# Patient Record
Sex: MALE | Race: WHITE | NOT HISPANIC OR LATINO | Employment: FULL TIME | URBAN - METROPOLITAN AREA
[De-identification: names, ages, dates, MRNs, and addresses within clinical notes are randomized per-mention and may not be internally consistent; named-entity substitution may affect disease eponyms.]

---

## 2020-05-05 ENCOUNTER — APPOINTMENT (OUTPATIENT)
Dept: LAB | Facility: CLINIC | Age: 30
End: 2020-05-05
Payer: COMMERCIAL

## 2020-05-05 ENCOUNTER — RESULTS ONLY (OUTPATIENT)
Dept: LAB | Age: 30
End: 2020-05-05

## 2020-05-07 LAB
COVID-19 SPIKE RBD ABY TITER: NORMAL
COVID-19 SPIKE RBD ABY: NEGATIVE

## 2021-05-18 ENCOUNTER — TELEPHONE (OUTPATIENT)
Dept: FAMILY MEDICINE | Facility: CLINIC | Age: 31
End: 2021-05-18

## 2021-05-18 DIAGNOSIS — L03.039 CELLULITIS OF TOE, UNSPECIFIED LATERALITY: Primary | ICD-10-CM

## 2021-05-18 RX ORDER — CEPHALEXIN 500 MG/1
500 CAPSULE ORAL 3 TIMES DAILY
Qty: 30 CAPSULE | Refills: 0 | Status: SHIPPED | OUTPATIENT
Start: 2021-05-18 | End: 2021-05-28

## 2021-05-18 NOTE — TELEPHONE ENCOUNTER
Patient would like a prescription sent to the [facility].    [facility] [place]

## 2021-09-28 ENCOUNTER — TELEPHONE (OUTPATIENT)
Dept: FAMILY MEDICINE | Facility: CLINIC | Age: 31
End: 2021-09-28

## 2021-09-28 ENCOUNTER — ALLIED HEALTH/NURSE VISIT (OUTPATIENT)
Dept: FAMILY MEDICINE | Facility: CLINIC | Age: 31
End: 2021-09-28
Payer: COMMERCIAL

## 2021-09-28 DIAGNOSIS — Z20.822 ENCOUNTER FOR LABORATORY TESTING FOR COVID-19 VIRUS: Primary | ICD-10-CM

## 2021-09-28 DIAGNOSIS — Z20.822 ENCOUNTER FOR LABORATORY TESTING FOR COVID-19 VIRUS: ICD-10-CM

## 2021-09-28 LAB — SARS-COV-2 RNA RESP QL NAA+PROBE: NEGATIVE

## 2021-09-28 PROCEDURE — 99207 PR NO CHARGE LOS: CPT

## 2021-09-28 PROCEDURE — U0003 INFECTIOUS AGENT DETECTION BY NUCLEIC ACID (DNA OR RNA); SEVERE ACUTE RESPIRATORY SYNDROME CORONAVIRUS 2 (SARS-COV-2) (CORONAVIRUS DISEASE [COVID-19]), AMPLIFIED PROBE TECHNIQUE, MAKING USE OF HIGH THROUGHPUT TECHNOLOGIES AS DESCRIBED BY CMS-2020-01-R: HCPCS

## 2021-09-28 PROCEDURE — U0005 INFEC AGEN DETEC AMPLI PROBE: HCPCS

## 2022-02-15 ENCOUNTER — TELEPHONE (OUTPATIENT)
Dept: FAMILY MEDICINE | Facility: CLINIC | Age: 32
End: 2022-02-15
Payer: COMMERCIAL

## 2022-02-15 DIAGNOSIS — M62.838 NECK MUSCLE SPASM: Primary | ICD-10-CM

## 2022-02-15 NOTE — TELEPHONE ENCOUNTER
Experiencing neck spasms and resolving with intervention requests referral for physical therapy referral placed    Deanne Vargas CNP

## 2022-02-18 ENCOUNTER — OFFICE VISIT (OUTPATIENT)
Dept: FAMILY MEDICINE | Facility: CLINIC | Age: 32
End: 2022-02-18
Payer: COMMERCIAL

## 2022-02-18 VITALS
BODY MASS INDEX: 44.72 KG/M2 | HEIGHT: 70 IN | HEART RATE: 72 BPM | RESPIRATION RATE: 20 BRPM | DIASTOLIC BLOOD PRESSURE: 74 MMHG | WEIGHT: 312.4 LBS | OXYGEN SATURATION: 99 % | SYSTOLIC BLOOD PRESSURE: 132 MMHG | TEMPERATURE: 96 F

## 2022-02-18 DIAGNOSIS — Z13.220 LIPID SCREENING: ICD-10-CM

## 2022-02-18 DIAGNOSIS — Z00.00 ROUTINE HISTORY AND PHYSICAL EXAMINATION OF ADULT: Primary | ICD-10-CM

## 2022-02-18 DIAGNOSIS — Z82.49 FAMILY HISTORY OF PREMATURE CORONARY ARTERY DISEASE: ICD-10-CM

## 2022-02-18 DIAGNOSIS — Z87.768: ICD-10-CM

## 2022-02-18 DIAGNOSIS — E66.01 MORBID OBESITY (H): ICD-10-CM

## 2022-02-18 DIAGNOSIS — Z11.59 NEED FOR HEPATITIS C SCREENING TEST: ICD-10-CM

## 2022-02-18 DIAGNOSIS — Z11.4 SCREENING FOR HIV (HUMAN IMMUNODEFICIENCY VIRUS): ICD-10-CM

## 2022-02-18 DIAGNOSIS — Z13.1 SCREENING FOR DIABETES MELLITUS: ICD-10-CM

## 2022-02-18 LAB
ERYTHROCYTE [DISTWIDTH] IN BLOOD BY AUTOMATED COUNT: 13.3 % (ref 10–15)
HCT VFR BLD AUTO: 45.7 % (ref 40–53)
HGB BLD-MCNC: 15.5 G/DL (ref 13.3–17.7)
MCH RBC QN AUTO: 29.2 PG (ref 26.5–33)
MCHC RBC AUTO-ENTMCNC: 33.9 G/DL (ref 31.5–36.5)
MCV RBC AUTO: 86 FL (ref 78–100)
PLATELET # BLD AUTO: 258 10E3/UL (ref 150–450)
RBC # BLD AUTO: 5.31 10E6/UL (ref 4.4–5.9)
WBC # BLD AUTO: 8.3 10E3/UL (ref 4–11)

## 2022-02-18 PROCEDURE — 99385 PREV VISIT NEW AGE 18-39: CPT | Mod: 25 | Performed by: FAMILY MEDICINE

## 2022-02-18 PROCEDURE — 85027 COMPLETE CBC AUTOMATED: CPT | Performed by: FAMILY MEDICINE

## 2022-02-18 PROCEDURE — 80061 LIPID PANEL: CPT | Performed by: FAMILY MEDICINE

## 2022-02-18 PROCEDURE — 90715 TDAP VACCINE 7 YRS/> IM: CPT | Performed by: FAMILY MEDICINE

## 2022-02-18 PROCEDURE — 90471 IMMUNIZATION ADMIN: CPT | Performed by: FAMILY MEDICINE

## 2022-02-18 PROCEDURE — 87389 HIV-1 AG W/HIV-1&-2 AB AG IA: CPT | Performed by: FAMILY MEDICINE

## 2022-02-18 PROCEDURE — 86803 HEPATITIS C AB TEST: CPT | Performed by: FAMILY MEDICINE

## 2022-02-18 PROCEDURE — 36415 COLL VENOUS BLD VENIPUNCTURE: CPT | Performed by: FAMILY MEDICINE

## 2022-02-18 PROCEDURE — 80053 COMPREHEN METABOLIC PANEL: CPT | Performed by: FAMILY MEDICINE

## 2022-02-18 ASSESSMENT — ENCOUNTER SYMPTOMS
FREQUENCY: 0
WEAKNESS: 0
HEADACHES: 0
SORE THROAT: 0
CHILLS: 0
HEMATOCHEZIA: 0
CONSTIPATION: 0
JOINT SWELLING: 0
COUGH: 0
PARESTHESIAS: 0
HEMATURIA: 0
DYSURIA: 0
EYE PAIN: 0
ABDOMINAL PAIN: 0
HEARTBURN: 0
MYALGIAS: 0
NERVOUS/ANXIOUS: 0
DIARRHEA: 0
SHORTNESS OF BREATH: 0
PALPITATIONS: 0
FEVER: 0
ARTHRALGIAS: 0
DIZZINESS: 0
NAUSEA: 0

## 2022-02-18 ASSESSMENT — PATIENT HEALTH QUESTIONNAIRE - PHQ9: SUM OF ALL RESPONSES TO PHQ QUESTIONS 1-9: 4

## 2022-02-18 ASSESSMENT — PAIN SCALES - GENERAL: PAINLEVEL: NO PAIN (0)

## 2022-02-18 NOTE — PROGRESS NOTES
SUBJECTIVE:   CC: Anjum Tejada is an 32 year old male who presents for preventative health visit.       Patient has been advised of split billing requirements and indicates understanding: Yes  Healthy Habits:     Getting at least 3 servings of Calcium per day:  Yes    Bi-annual eye exam:  Yes    Dental care twice a year:  Yes    Sleep apnea or symptoms of sleep apnea:  None    Diet:  Regular (no restrictions)    Frequency of exercise:  2-3 days/week    Duration of exercise:  15-30 minutes    Taking medications regularly:  Yes    Medication side effects:  Not applicable    PHQ-2 Total Score: 0    Additional concerns today:  No    His mother  of leukemia in her 50.  His father had early cad age 45.  His father is still alive and has had more cardiac issues.  He has considered a coronary calcium score.  He has not had a cholesterol test that he is aware of.    He would like to lose weight.  The patient is aware of what he needs to do to lose weight.  He states his general diet is very healthy however he sometimes riccardo with food.  He is able to exercise routinely does so.  He has some pain related to his clubfeet however does not slow him down.        Today's PHQ-2 Score:   PHQ-2 (  [employer]) 2022   Q1: Little interest or pleasure in doing things 0   Q2: Feeling down, depressed or hopeless 0   PHQ-2 Score 0   Q1: Little interest or pleasure in doing things Not at all   Q2: Feeling down, depressed or hopeless Not at all   PHQ-2 Score 0       Abuse: Current or Past(Physical, Sexual or Emotional)- No  Do you feel safe in your environment? Yes    Have you ever done Advance Care Planning? (For example, a Health Directive, POLST, or a discussion with a medical provider or your loved ones about your wishes): No, advance care planning information given to patient to review.  Advanced care planning was discussed at today's visit.    Social History     Tobacco Use     Smoking status: Never Smoker     Smokeless  "tobacco: Never Used   Substance Use Topics     Alcohol use: Yes     Alcohol/week: 0.0 standard drinks     Comment: Once a week 1-2 drinks     If you drink alcohol do you typically have >3 drinks per day or >7 drinks per week? No    Alcohol Use 2/18/2022   Prescreen: >3 drinks/day or >7 drinks/week? No   Prescreen: >3 drinks/day or >7 drinks/week? -       Last PSA: No results found for: PSA    Reviewed orders with patient. Reviewed health maintenance and updated orders accordingly - Yes  BP Readings from Last 3 Encounters:   02/18/22 132/74    Wt Readings from Last 3 Encounters:   02/18/22 141.7 kg (312 lb 6.4 oz)                    Reviewed and updated as needed this visit by clinical staff   Tobacco  Allergies  Meds   Med Hx  Surg Hx  Fam Hx  Soc Hx        Reviewed and updated as needed this visit by Provider                     Review of Systems   Constitutional: Negative for chills and fever.   HENT: Negative for congestion, ear pain, hearing loss and sore throat.    Eyes: Negative for pain and visual disturbance.   Respiratory: Negative for cough and shortness of breath.    Cardiovascular: Negative for chest pain, palpitations and peripheral edema.   Gastrointestinal: Negative for abdominal pain, constipation, diarrhea, heartburn, hematochezia and nausea.   Genitourinary: Negative for dysuria, frequency, genital sores, hematuria, impotence, penile discharge and urgency.   Musculoskeletal: Negative for arthralgias, joint swelling and myalgias.   Skin: Negative for rash.   Neurological: Negative for dizziness, weakness, headaches and paresthesias.   Psychiatric/Behavioral: Negative for mood changes. The patient is not nervous/anxious.          OBJECTIVE:   /74 (BP Location: Right arm, Patient Position: Chair, Cuff Size: Adult Large)   Pulse 72   Temp (!) 96  F (35.6  C) (Tympanic)   Resp 20   Ht 1.772 m (5' 9.76\")   Wt 141.7 kg (312 lb 6.4 oz)   SpO2 99%   BMI 45.13 kg/m      Physical " Exam  GENERAL: healthy, alert, no distress and obese  EYES: Eyes grossly normal to inspection, PERRL and conjunctivae and sclerae normal  HENT: ear canals and TM's normal, nose and mouth without ulcers or lesions  NECK: no adenopathy, no asymmetry, masses, or scars and thyroid normal to palpation  RESP: lungs clear to auscultation - no rales, rhonchi or wheezes  CV: regular rate and rhythm, normal S1 S2, no S3 or S4, no murmur, click or rub, no peripheral edema and peripheral pulses strong  ABDOMEN: soft, nontender, no hepatosplenomegaly, no masses and bowel sounds normal  MS: no gross musculoskeletal defects noted, no edema  SKIN: numerous irregular moles > 3 mm in size over trunk and extremities   NEURO: Normal strength and tone, mentation intact and speech normal  PSYCH: mentation appears normal, affect normal/bright    Diagnostic Test Results:  Labs reviewed in Epic  No results found for any visits on 02/18/22.    ASSESSMENT/PLAN:   (Z00.00) Routine history and physical examination of adult  (primary encounter diagnosis)  Comment:   Plan:     (Z82.49) Family history of premature coronary artery disease  Comment:   Plan: We will see how the patient's cholesterol is.  We discussed coronary calcium score testing.  I would definitely suggest coronary calcium score by the age of 40 if not sooner.    (E66.01) Morbid obesity (H)  Comment: We discussed different options for obesity including support groups and medications.  Will await labs below.  I am checking  liver enzymes and blood sugar  Plan: Comprehensive metabolic panel (BMP + Alb, Alk         Phos, ALT, AST, Total. Bili, TP)            (Z11.4) Screening for HIV (human immunodeficiency virus)  Comment:   Plan: HIV Antigen Antibody Combo            (Z11.59) Need for hepatitis C screening test  Comment:   Plan: Hepatitis C Screen Reflex to HCV RNA Quant and         Genotype            (Z87.76) S/P correction of clubfoot  Comment:   Plan:     (Z13.220) Lipid  "screening  Comment:   Plan: Lipid panel reflex to direct LDL Fasting            (Z13.1) Screening for diabetes mellitus  Comment:   Plan: CBC with platelets                  COUNSELING:   Reviewed preventive health counseling, as reflected in patient instructions       Regular exercise       Healthy diet/nutrition    Estimated body mass index is 45.13 kg/m  as calculated from the following:    Height as of this encounter: 1.772 m (5' 9.76\").    Weight as of this encounter: 141.7 kg (312 lb 6.4 oz).     Weight management plan: Discussed healthy diet and exercise guidelines    He reports that he has never smoked. He has never used smokeless tobacco.      Counseling Resources:  ATP IV Guidelines  Pooled Cohorts Equation Calculator  FRAX Risk Assessment  ICSI Preventive Guidelines  Dietary Guidelines for Americans, 2010  USDA's MyPlate  ASA Prophylaxis  Lung CA Screening    Julia Putnam DO  [facility]  "

## 2022-02-20 LAB
HCV AB SERPL QL IA: NONREACTIVE
HIV 1+2 AB+HIV1 P24 AG SERPL QL IA: NONREACTIVE

## 2022-02-21 LAB
ALBUMIN SERPL-MCNC: 3.8 G/DL (ref 3.4–5)
ALP SERPL-CCNC: 67 U/L (ref 40–150)
ALT SERPL W P-5'-P-CCNC: 58 U/L (ref 0–70)
ANION GAP SERPL CALCULATED.3IONS-SCNC: 2 MMOL/L (ref 3–14)
AST SERPL W P-5'-P-CCNC: 24 U/L (ref 0–45)
BILIRUB SERPL-MCNC: 0.3 MG/DL (ref 0.2–1.3)
BUN SERPL-MCNC: 12 MG/DL (ref 7–30)
CALCIUM SERPL-MCNC: 9.1 MG/DL (ref 8.5–10.1)
CHLORIDE BLD-SCNC: 107 MMOL/L (ref 94–109)
CHOLEST SERPL-MCNC: 188 MG/DL
CO2 SERPL-SCNC: 29 MMOL/L (ref 20–32)
CREAT SERPL-MCNC: 1 MG/DL (ref 0.66–1.25)
FASTING STATUS PATIENT QL REPORTED: NO
GFR SERPL CREATININE-BSD FRML MDRD: >90 ML/MIN/1.73M2
GLUCOSE BLD-MCNC: 86 MG/DL (ref 70–99)
HDLC SERPL-MCNC: 39 MG/DL
LDLC SERPL CALC-MCNC: 105 MG/DL
NONHDLC SERPL-MCNC: 149 MG/DL
POTASSIUM BLD-SCNC: 4.1 MMOL/L (ref 3.4–5.3)
PROT SERPL-MCNC: 7.6 G/DL (ref 6.8–8.8)
SODIUM SERPL-SCNC: 138 MMOL/L (ref 133–144)
TRIGL SERPL-MCNC: 221 MG/DL

## 2022-09-19 ENCOUNTER — IMMUNIZATION (OUTPATIENT)
Dept: NURSING | Facility: CLINIC | Age: 32
End: 2022-09-19
Payer: COMMERCIAL

## 2022-09-19 PROCEDURE — 0134A COVID-19,PF,MODERNA BIVALENT: CPT

## 2022-09-19 PROCEDURE — 90686 IIV4 VACC NO PRSV 0.5 ML IM: CPT

## 2022-09-19 PROCEDURE — 91313 COVID-19,PF,MODERNA BIVALENT: CPT

## 2022-09-19 PROCEDURE — 90471 IMMUNIZATION ADMIN: CPT

## 2023-04-05 ENCOUNTER — TELEPHONE (OUTPATIENT)
Dept: FAMILY MEDICINE | Facility: CLINIC | Age: 33
End: 2023-04-05
Payer: COMMERCIAL

## 2023-04-05 DIAGNOSIS — M62.830 BACK MUSCLE SPASM: Primary | ICD-10-CM

## 2023-04-05 RX ORDER — CYCLOBENZAPRINE HCL 10 MG
5-10 TABLET ORAL 3 TIMES DAILY PRN
Qty: 20 TABLET | Refills: 0 | Status: SHIPPED | OUTPATIENT
Start: 2023-04-05 | End: 2023-09-08

## 2023-05-03 ENCOUNTER — OFFICE VISIT (OUTPATIENT)
Dept: INTERNAL MEDICINE | Facility: CLINIC | Age: 33
End: 2023-05-03
Payer: COMMERCIAL

## 2023-05-03 VITALS
RESPIRATION RATE: 16 BRPM | OXYGEN SATURATION: 99 % | TEMPERATURE: 98.1 F | HEIGHT: 70 IN | HEART RATE: 83 BPM | DIASTOLIC BLOOD PRESSURE: 82 MMHG | SYSTOLIC BLOOD PRESSURE: 122 MMHG | WEIGHT: 305.1 LBS | BODY MASS INDEX: 43.68 KG/M2

## 2023-05-03 DIAGNOSIS — Z13.1 SCREENING FOR DIABETES MELLITUS: ICD-10-CM

## 2023-05-03 DIAGNOSIS — Z00.00 ROUTINE GENERAL MEDICAL EXAMINATION AT A HEALTH CARE FACILITY: Primary | ICD-10-CM

## 2023-05-03 DIAGNOSIS — E66.01 MORBID OBESITY (H): ICD-10-CM

## 2023-05-03 DIAGNOSIS — E78.2 MIXED HYPERLIPIDEMIA: ICD-10-CM

## 2023-05-03 LAB
ALBUMIN SERPL BCG-MCNC: 4.5 G/DL (ref 3.5–5.2)
ALP SERPL-CCNC: 76 U/L (ref 40–129)
ALT SERPL W P-5'-P-CCNC: 57 U/L (ref 10–50)
ANION GAP SERPL CALCULATED.3IONS-SCNC: 11 MMOL/L (ref 7–15)
AST SERPL W P-5'-P-CCNC: 34 U/L (ref 10–50)
BASOPHILS # BLD AUTO: 0 10E3/UL (ref 0–0.2)
BASOPHILS NFR BLD AUTO: 1 %
BILIRUB SERPL-MCNC: 0.6 MG/DL
BUN SERPL-MCNC: 14.2 MG/DL (ref 6–20)
CALCIUM SERPL-MCNC: 9.7 MG/DL (ref 8.6–10)
CHLORIDE SERPL-SCNC: 102 MMOL/L (ref 98–107)
CHOLEST SERPL-MCNC: 193 MG/DL
CREAT SERPL-MCNC: 1.02 MG/DL (ref 0.67–1.17)
DEPRECATED HCO3 PLAS-SCNC: 27 MMOL/L (ref 22–29)
EOSINOPHIL # BLD AUTO: 0.1 10E3/UL (ref 0–0.7)
EOSINOPHIL NFR BLD AUTO: 2 %
ERYTHROCYTE [DISTWIDTH] IN BLOOD BY AUTOMATED COUNT: 12.4 % (ref 10–15)
GFR SERPL CREATININE-BSD FRML MDRD: >90 ML/MIN/1.73M2
GLUCOSE SERPL-MCNC: 97 MG/DL (ref 70–99)
HBA1C MFR BLD: 5.1 % (ref 0–5.6)
HCT VFR BLD AUTO: 45.8 % (ref 40–53)
HDLC SERPL-MCNC: 37 MG/DL
HGB BLD-MCNC: 16.1 G/DL (ref 13.3–17.7)
IMM GRANULOCYTES # BLD: 0 10E3/UL
IMM GRANULOCYTES NFR BLD: 0 %
LDLC SERPL CALC-MCNC: 133 MG/DL
LYMPHOCYTES # BLD AUTO: 2.8 10E3/UL (ref 0.8–5.3)
LYMPHOCYTES NFR BLD AUTO: 42 %
MCH RBC QN AUTO: 29.8 PG (ref 26.5–33)
MCHC RBC AUTO-ENTMCNC: 35.2 G/DL (ref 31.5–36.5)
MCV RBC AUTO: 85 FL (ref 78–100)
MONOCYTES # BLD AUTO: 0.4 10E3/UL (ref 0–1.3)
MONOCYTES NFR BLD AUTO: 6 %
NEUTROPHILS # BLD AUTO: 3.2 10E3/UL (ref 1.6–8.3)
NEUTROPHILS NFR BLD AUTO: 49 %
NONHDLC SERPL-MCNC: 156 MG/DL
PLATELET # BLD AUTO: 253 10E3/UL (ref 150–450)
POTASSIUM SERPL-SCNC: 4.4 MMOL/L (ref 3.4–5.3)
PROT SERPL-MCNC: 7.9 G/DL (ref 6.4–8.3)
RBC # BLD AUTO: 5.4 10E6/UL (ref 4.4–5.9)
SODIUM SERPL-SCNC: 140 MMOL/L (ref 136–145)
TRIGL SERPL-MCNC: 116 MG/DL
WBC # BLD AUTO: 6.6 10E3/UL (ref 4–11)

## 2023-05-03 PROCEDURE — 80061 LIPID PANEL: CPT | Performed by: INTERNAL MEDICINE

## 2023-05-03 PROCEDURE — 80053 COMPREHEN METABOLIC PANEL: CPT | Performed by: INTERNAL MEDICINE

## 2023-05-03 PROCEDURE — 99395 PREV VISIT EST AGE 18-39: CPT | Performed by: INTERNAL MEDICINE

## 2023-05-03 PROCEDURE — 36415 COLL VENOUS BLD VENIPUNCTURE: CPT | Performed by: INTERNAL MEDICINE

## 2023-05-03 PROCEDURE — 99214 OFFICE O/P EST MOD 30 MIN: CPT | Mod: 25 | Performed by: INTERNAL MEDICINE

## 2023-05-03 PROCEDURE — 85025 COMPLETE CBC W/AUTO DIFF WBC: CPT | Performed by: INTERNAL MEDICINE

## 2023-05-03 PROCEDURE — 83036 HEMOGLOBIN GLYCOSYLATED A1C: CPT | Performed by: INTERNAL MEDICINE

## 2023-05-03 ASSESSMENT — ENCOUNTER SYMPTOMS
PALPITATIONS: 0
JOINT SWELLING: 0
SHORTNESS OF BREATH: 0
HEMATOCHEZIA: 0
HEARTBURN: 0
DIARRHEA: 0
HEADACHES: 0
HEMATURIA: 0
ABDOMINAL PAIN: 0
SORE THROAT: 0
COUGH: 0
FREQUENCY: 0
CONSTIPATION: 0
CHILLS: 0
EYE PAIN: 0
NAUSEA: 0
DIZZINESS: 0
DYSURIA: 0
WEAKNESS: 0
MYALGIAS: 0
PARESTHESIAS: 0
NERVOUS/ANXIOUS: 0
FEVER: 0
ARTHRALGIAS: 0

## 2023-05-03 NOTE — ASSESSMENT & PLAN NOTE
Lifelong issue, up to 350 in high school. Lowest 280-290 pre-pandemic, now kind of stuck around 300-310. Interested in medications, nutrition help, possibly surgery.   - Start wegovy and ramp up as tolerated  - Weight management referral   - F/u in 3-4 months

## 2023-05-03 NOTE — ASSESSMENT & PLAN NOTE
Lipids last year mildly elevated - 2/18/22 Tchol 188, , HDL 39, . Also has family hx of early coronary disease in father.   - Repeat lipids  - CT coronary calcium score

## 2023-05-03 NOTE — PROGRESS NOTES
SUBJECTIVE:   CC: Anjum is an 33 year old who presents for preventative health visit.       5/3/2023     9:03 AM   Additional Questions   Roomed by Holly RAY CMA   Patient has been advised of split billing requirements and indicates understanding: Yes  Healthy Habits:     Getting at least 3 servings of Calcium per day:  Yes    Bi-annual eye exam:  Yes    Dental care twice a year:  Yes    Sleep apnea or symptoms of sleep apnea:  None    Diet:  Regular (no restrictions)    Frequency of exercise:  2-3 days/week    Duration of exercise:  15-30 minutes    Taking medications regularly:  Yes    Medication side effects:  Not applicable and None    PHQ-2 Total Score: 0    Additional concerns today:  Yes    Obesity: Weight 305 lbs, BMI 44.09 today.   - Lowest 280-290 prior to COVID  - Stable around 300 over last few years   - Struggles with portion sizes  - Working on increasing exercise  - Considering CBT    [facility].     HLD: 2/18/22 Tchol 188, , HDL 39,     Dad has had CABG and multiple stents.     Today's PHQ-2 Score:       5/3/2023     9:08 AM   PHQ-2 ( 1999 [employer])   Q1: Little interest or pleasure in doing things 0   Q2: Feeling down, depressed or hopeless 0   PHQ-2 Score 0   Q1: Little interest or pleasure in doing things Not at all   Q2: Feeling down, depressed or hopeless Not at all   PHQ-2 Score 0       Social History     Tobacco Use     Smoking status: Never     Smokeless tobacco: Never   Vaping Use     Vaping status: Not on file   Substance Use Topics     Alcohol use: Yes     Alcohol/week: 0.0 standard drinks of alcohol     Comment: Once a week 1-2 drinks           5/3/2023     9:08 AM   Alcohol Use   Prescreen: >3 drinks/day or >7 drinks/week? No       Last PSA: No results found for: PSA    Reviewed orders with patient. 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 No pertinent surgical history.    Review of Systems   Constitutional: Negative for chills and fever.   HENT: Negative for congestion, ear pain, hearing loss and sore throat.    Eyes: Negative for pain and visual disturbance.   Respiratory: Negative for cough and shortness of breath.    Cardiovascular: Negative for chest pain, palpitations and peripheral edema.   Gastrointestinal: Negative for abdominal pain, constipation, diarrhea, heartburn, hematochezia and nausea.   Genitourinary: Negative for dysuria, frequency, genital sores, hematuria, impotence, penile discharge and urgency.   Musculoskeletal: Negative for arthralgias, joint swelling and myalgias.   Skin: Negative for rash.   Neurological: Negative for dizziness, weakness, headaches and paresthesias.   Psychiatric/Behavioral: Negative for mood changes. The patient is not nervous/anxious.        OBJECTIVE:   /82 (BP Location: Right arm, Patient Position: Sitting, Cuff Size: Adult Large)   Pulse 83   Temp 98.1  F (36.7  C) (Oral)   Resp 16   Ht 1.772 m (5' 9.75\")   Wt 138.4 kg (305 lb 1.6 oz)   SpO2 99%   BMI 44.09 kg/m      Physical Exam  GENERAL: healthy, obese, alert and no distress  EYES: Eyes grossly normal to inspection, PERRL and conjunctivae and sclerae normal  HENT: ear canals and TM's normal, nose and mouth without ulcers or lesions  NECK: no adenopathy, no asymmetry, masses, or scars and thyroid normal to palpation  RESP: lungs clear to auscultation - no rales, rhonchi or wheezes  CV: regular rate and rhythm, normal S1 S2, no S3 or S4, no murmur, click or rub, no peripheral edema and peripheral pulses " strong  ABDOMEN: soft, nontender, no hepatosplenomegaly, no masses and bowel sounds normal  MS: no gross musculoskeletal defects noted, no edema  SKIN: no suspicious lesions or rashes on exposed skin   NEURO: Normal strength and tone, mentation intact and speech normal  PSYCH: mentation appears normal, affect normal/bright    Diagnostic Test Results:  Labs reviewed in Epic    ASSESSMENT/PLAN:     Problem List Items Addressed This Visit        Digestive    Morbid obesity (H)     Lifelong issue, up to 350 in high school. Lowest 280-290 pre-pandemic, now kind of stuck around 300-310. Interested in medications, nutrition help, possibly surgery.   - Start wegovy and ramp up as tolerated  - Weight management referral   - F/u in 3-4 months         Relevant Medications    Semaglutide-Weight Management (WEGOVY) 0.25 MG/0.5ML pen    insulin pen needle (31G X 5 MM) 31G X 5 MM miscellaneous    Other Relevant Orders    Adult Comprehensive Weight Management  Referral       Endocrine    Mixed hyperlipidemia     Lipids last year mildly elevated - 2/18/22 Tchol 188, , HDL 39, . Also has family hx of early coronary disease in father.   - Repeat lipids  - CT coronary calcium score         Relevant Orders    Lipid Profile (Chol, Trig, HDL, LDL calc)    CT Coronary Calcium Scan       Other    Routine general medical examination at a health care facility - Primary     We discussed healthy lifestyle, nutrition, cardiovascular risk reduction, self care, safety, sunscreen, and timing of cancer screening.  Health maintenance screening and immunizations reviewed with the patient.  Follow up yearly for the annual physical.           Relevant Orders    Comprehensive metabolic panel    CBC with platelets and differential   Other Visit Diagnoses     Screening for diabetes mellitus        Relevant Orders    Hemoglobin A1c        Patient has been advised of split billing requirements and indicates understanding:  "Yes    COUNSELING:   Reviewed preventive health counseling, as reflected in patient instructions  Special attention given to:        Regular exercise       Healthy diet/nutrition    BMI:   Estimated body mass index is 44.09 kg/m  as calculated from the following:    Height as of this encounter: 1.772 m (5' 9.75\").    Weight as of this encounter: 138.4 kg (305 lb 1.6 oz).   Weight management plan: Patient referred to endocrine and/or weight management specialty Discussed healthy diet and exercise guidelines      He reports that he has never smoked. He has never used smokeless tobacco.      Olga Lidia Carmona MD  [facility]  "

## 2023-05-03 NOTE — ASSESSMENT & PLAN NOTE
We discussed healthy lifestyle, nutrition, cardiovascular risk reduction, self care, safety, sunscreen, and timing of cancer screening.  Health maintenance screening and immunizations reviewed with the patient.  Follow up yearly for the annual physical.

## 2023-05-12 ENCOUNTER — MYC MEDICAL ADVICE (OUTPATIENT)
Dept: INTERNAL MEDICINE | Facility: CLINIC | Age: 33
End: 2023-05-12
Payer: COMMERCIAL

## 2023-05-12 DIAGNOSIS — E66.01 MORBID OBESITY (H): Primary | ICD-10-CM

## 2023-06-27 ENCOUNTER — MYC MEDICAL ADVICE (OUTPATIENT)
Dept: INTERNAL MEDICINE | Facility: CLINIC | Age: 33
End: 2023-06-27
Payer: COMMERCIAL

## 2023-06-27 DIAGNOSIS — E66.01 MORBID OBESITY (H): Primary | ICD-10-CM

## 2023-09-08 ENCOUNTER — LAB (OUTPATIENT)
Dept: LAB | Facility: CLINIC | Age: 33
End: 2023-09-08
Payer: COMMERCIAL

## 2023-09-08 ENCOUNTER — OFFICE VISIT (OUTPATIENT)
Dept: INTERNAL MEDICINE | Facility: CLINIC | Age: 33
End: 2023-09-08
Payer: COMMERCIAL

## 2023-09-08 VITALS
HEART RATE: 73 BPM | WEIGHT: 275.1 LBS | BODY MASS INDEX: 39.38 KG/M2 | TEMPERATURE: 98.5 F | RESPIRATION RATE: 20 BRPM | SYSTOLIC BLOOD PRESSURE: 112 MMHG | DIASTOLIC BLOOD PRESSURE: 73 MMHG | HEIGHT: 70 IN | OXYGEN SATURATION: 99 %

## 2023-09-08 DIAGNOSIS — E66.01 MORBID OBESITY (H): Primary | ICD-10-CM

## 2023-09-08 DIAGNOSIS — R79.89 ELEVATED LFTS: ICD-10-CM

## 2023-09-08 LAB
ALBUMIN SERPL BCG-MCNC: 4.4 G/DL (ref 3.5–5.2)
ALP SERPL-CCNC: 84 U/L (ref 40–129)
ALT SERPL W P-5'-P-CCNC: 49 U/L (ref 0–70)
ANION GAP SERPL CALCULATED.3IONS-SCNC: 5 MMOL/L (ref 7–15)
AST SERPL W P-5'-P-CCNC: 31 U/L (ref 0–45)
BILIRUB SERPL-MCNC: 0.4 MG/DL
BUN SERPL-MCNC: 10.4 MG/DL (ref 6–20)
CALCIUM SERPL-MCNC: 10 MG/DL (ref 8.6–10)
CHLORIDE SERPL-SCNC: 101 MMOL/L (ref 98–107)
CREAT SERPL-MCNC: 1.08 MG/DL (ref 0.67–1.17)
DEPRECATED HCO3 PLAS-SCNC: 32 MMOL/L (ref 22–29)
EGFRCR SERPLBLD CKD-EPI 2021: >90 ML/MIN/1.73M2
GLUCOSE SERPL-MCNC: 87 MG/DL (ref 70–99)
POTASSIUM SERPL-SCNC: 4.8 MMOL/L (ref 3.4–5.3)
PROT SERPL-MCNC: 7.6 G/DL (ref 6.4–8.3)
SODIUM SERPL-SCNC: 138 MMOL/L (ref 136–145)

## 2023-09-08 PROCEDURE — 99213 OFFICE O/P EST LOW 20 MIN: CPT | Performed by: INTERNAL MEDICINE

## 2023-09-08 PROCEDURE — 36415 COLL VENOUS BLD VENIPUNCTURE: CPT

## 2023-09-08 PROCEDURE — 80053 COMPREHEN METABOLIC PANEL: CPT

## 2023-09-08 ASSESSMENT — PAIN SCALES - GENERAL: PAINLEVEL: NO PAIN (0)

## 2023-09-08 NOTE — ASSESSMENT & PLAN NOTE
ALT slightly up last time. Has lost >10% body weight, hopeful this will be normalized on recheck.   - CMP ordered today

## 2023-09-08 NOTE — ASSESSMENT & PLAN NOTE
Patient presents for follow up of obesity. We started wegovy 5/3/23, weight was 305 lbs at that visit. He is up to 2.4 mg weekly. Does note he needs to have small meals otherwise has side effects but this is tolerable and he would like to continue current dose.   - Congratulated success thus far!  - Continue wegovy 2.4 mg weekly   - Exercise 30 min, 5 days a week is the goal  - F/up in 6 months

## 2023-09-08 NOTE — PROGRESS NOTES
Assessment & Plan   Problem List Items Addressed This Visit          Digestive    Morbid obesity (H) - Primary     Patient presents for follow up of obesity. We started wegovy 5/3/23, weight was 305 lbs at that visit. He is up to 2.4 mg weekly. Does note he needs to have small meals otherwise has side effects but this is tolerable and he would like to continue current dose.   - Congratulated success thus far!  - Continue wegovy 2.4 mg weekly   - Exercise 30 min, 5 days a week is the goal  - F/up in 6 months             Other    Elevated LFTs     ALT slightly up last time. Has lost >10% body weight, hopeful this will be normalized on recheck.   - CMP ordered today         Relevant Orders    Comprehensive metabolic panel        Ordering of each unique test  Prescription drug management    FUTURE APPOINTMENTS:       - Follow-up visit in 6 months     Olga Lidia Anai Carmona MD  [facility]    Terry Valero is a 33 year old, presenting for the following health issues:  Weight Problem (Weight management F/U)        9/8/2023     7:13 AM   Additional Questions   Roomed by ALFA Page   Accompanied by NELSON     History of Present Illness     Reason for visit:  Obesity    Obesity: Last visit started wegovy. Now up to 2.4 mg weekly, just started this dose 1-2 weeks ago. Tolerable side effects. Notes that he has to eat very small meals and use pepcid. Overall very happy with success thus far. Down 30 lbs from last visit.     Wt Readings from Last 4 Encounters:   09/08/23 124.8 kg (275 lb 1.6 oz)   05/03/23 138.4 kg (305 lb 1.6 oz)   02/18/22 141.7 kg (312 lb 6.4 oz)     ALT up at 57 last time. We will repeat this today     He eats 2-3 servings of fruits and vegetables daily.He consumes 0 sweetened beverage(s) daily.He exercises with enough effort to increase his heart rate 10 to 19 minutes per day.  He exercises with enough effort to increase his heart rate 3 or less days per week.   He is  "taking medications regularly.      Review of Systems   Constitutional, HEENT, cardiovascular, pulmonary, gi and gu systems are negative, except as otherwise noted.      Objective    /73 (BP Location: Right arm, Patient Position: Sitting, Cuff Size: Adult Large)   Pulse 73   Temp 98.5  F (36.9  C) (Oral)   Resp 20   Ht 1.772 m (5' 9.75\")   Wt 124.8 kg (275 lb 1.6 oz)   SpO2 99%   BMI 39.76 kg/m    Body mass index is 39.76 kg/m .  Physical Exam   GENERAL: healthy, alert and no distress  EYES: Eyes grossly normal to inspection and conjunctivae and sclerae normal  HENT: nose and mouth without ulcers or lesions  RESP: breathing comfortably and speaking in full sentences on room air with no respiratory distress or coughing  CV: warm and well perfused  SKIN: no suspicious lesions or rashes on exposed skin  NEURO: No focal deficits, mentation intact and speech normal  PSYCH: mentation appears normal, affect normal/bright            Answers submitted by the patient for this visit:  General Questionnaire (Submitted on 9/7/2023)  Chief Complaint: Chronic problems general questions HPI Form  What is the reason for your visit today? : Obesity  How many servings of fruits and vegetables do you eat daily?: 2-3  On average, how many sweetened beverages do you drink each day (Examples: soda, juice, sweet tea, etc.  Do NOT count diet or artificially sweetened beverages)?: 0  How many minutes a day do you exercise enough to make your heart beat faster?: 10 to 19  How many days a week do you exercise enough to make your heart beat faster?: 3 or less  How many days per week do you miss taking your medication?: 0    "

## 2023-10-12 ENCOUNTER — ALLIED HEALTH/NURSE VISIT (OUTPATIENT)
Dept: FAMILY MEDICINE | Facility: CLINIC | Age: 33
End: 2023-10-12
Payer: COMMERCIAL

## 2023-10-12 DIAGNOSIS — Z23 HIGH PRIORITY FOR 2019-NCOV VACCINE: Primary | ICD-10-CM

## 2023-10-12 PROCEDURE — 90480 ADMN SARSCOV2 VAC 1/ONLY CMP: CPT

## 2023-10-12 PROCEDURE — 99207 PR NO CHARGE LOS: CPT

## 2023-10-12 PROCEDURE — 91320 SARSCV2 VAC 30MCG TRS-SUC IM: CPT

## 2023-11-16 ENCOUNTER — MYC REFILL (OUTPATIENT)
Dept: INTERNAL MEDICINE | Facility: CLINIC | Age: 33
End: 2023-11-16
Payer: COMMERCIAL

## 2023-11-16 DIAGNOSIS — E66.01 MORBID OBESITY (H): ICD-10-CM

## 2023-11-16 NOTE — TELEPHONE ENCOUNTER
Prescription approved per [facility] Refill Protocol.  Emily LOTT RN  [facility]

## 2023-12-07 ENCOUNTER — TELEPHONE (OUTPATIENT)
Dept: INTERNAL MEDICINE | Facility: CLINIC | Age: 33
End: 2023-12-07
Payer: COMMERCIAL

## 2023-12-07 DIAGNOSIS — E66.01 MORBID OBESITY (H): ICD-10-CM

## 2023-12-12 NOTE — TELEPHONE ENCOUNTER
Prior Authorization Approval    Medication: WEGOVY 2.4 MG/0.75ML SC SOAJ  Authorization Effective Date: 12/12/2023  Authorization Expiration Date: 12/11/2024  Approved Dose/Quantity:   Reference #:     Insurance Company: [employer] - Phone [phone] Fax [phone]  Expected CoPay: $    CoPay Card Available:      Financial Assistance Needed:   Which Pharmacy is filling the prescription: [facility] #[id number] [facility] [address] AT [facility]  Pharmacy Notified: YES  Patient Notified: **Instructed pharmacy to notify patient when script is ready to /ship.**

## 2023-12-12 NOTE — TELEPHONE ENCOUNTER
PA Initiation    Medication: WEGOVY 2.4 MG/0.75ML SC SOAJ  Insurance Company: [employer] - Phone [phone] Fax [phone]  Pharmacy Filling the Rx: [facility] #[id number] [facility] [address] AT [facility]  Filling Pharmacy Phone: [phone]  Filling Pharmacy Fax: [phone]  Start Date: 12/11/2023

## 2024-01-11 ENCOUNTER — TELEPHONE (OUTPATIENT)
Dept: SURGERY | Facility: CLINIC | Age: 34
End: 2024-01-11
Payer: COMMERCIAL

## 2024-01-11 ENCOUNTER — VIRTUAL VISIT (OUTPATIENT)
Dept: CARDIOLOGY | Facility: CLINIC | Age: 34
End: 2024-01-11
Attending: PHYSICIAN ASSISTANT
Payer: COMMERCIAL

## 2024-01-11 VITALS — BODY MASS INDEX: 38.59 KG/M2 | WEIGHT: 267 LBS

## 2024-01-11 DIAGNOSIS — E66.01 MORBID OBESITY (H): Primary | ICD-10-CM

## 2024-01-11 DIAGNOSIS — E66.01 MORBID OBESITY (H): ICD-10-CM

## 2024-01-11 ASSESSMENT — PAIN SCALES - GENERAL: PAINLEVEL: NO PAIN (0)

## 2024-01-11 NOTE — PROGRESS NOTES
Virtual Visit Details    Type of service:  Video Visit     Originating Location (pt. Location): Home    Distant Location (provider location):  Off-site  Platform used for Video Visit: Alexander

## 2024-01-11 NOTE — Clinical Note
1/11/2024      RE: Anjum Tejada  [address]  [facility] [address]       Dear Colleague,    Thank you for the opportunity to participate in the care of your patient, Anjum Tejada, at the [facility]. Please see a copy of my visit note below.    Virtual Visit Details    Type of service:  Video Visit     Originating Location (pt. Location): Home  {PROVIDER LOCATION On-site should be selected for visits conducted from your clinic location or adjoining [facility] hospital, academic office, or other nearby [facility] building. Off-site should be selected for all other provider locations, including home:[id number]}  Distant Location (provider location):  Off-site  Platform used for Video Visit: T-System    Medication Therapy Management (MTM) Encounter    ASSESSMENT:                            Medication Adherence/Access: No issues identified    Weight management: Weight loss progress has reached relative plateau, however patient global response to Wegovy at 2.4 mg has yielded significant weight loss without problematic adverse effects.  Mild constipation is self-limited.  Appropriate candidate to continue Wegovy 2.4 mg once weekly.  Briefly discussed potential use of Zepbound if continued weight plateau.  Pretreatment BMI greater than 40 kg/m . Negative history of pancreatitis, medullary thyroid cancer and multiple endocrine neoplasia type 2.      For patients that are under [employer] insurance coverage, it is mandated by insurance that each qualifying patient meet with hospital based Weight Management Medication Therapy Management pharmacist to continue therapy coverage. The following patient meets the below coverage criteria and can therefore continue GLP-1/GIP agonist therapy for Weight Management:    Adult  BMI >40 with or without comorbidities   OR   BMI >30 + NAFLD*   at time of initiating GLP-1/GIP agonist therapy  Approved for 29 weeks  Met Updated Initial Criteria   At least 5% weight loss of baseline body weight  Approved for 12 months        PLAN:                            Remain on Wegovy 2.4 mg once weekly.     Follow up with me in June as scheduled.     SUBJECTIVE/OBJECTIVE:                          Anjum Tejada is a 33 year old male contacted via secure video for an initial visit. He was referred to me from [facility] Insurance Requirement.      Reason for visit: GLP-1 agonist consult.    Allergies/ADRs: Reviewed in chart  Past Medical History: Reviewed in chart  Tobacco: He reports that he has never smoked. He has never used smokeless tobacco.    Medication Adherence/Access: no issues reported    Weight management:  Wegovy 2.4 mg weekly     Has been on Wegovy since May 2023, max dose for 4 months. Weight loss has currently stalled, though feels he has some things he can implement to move things along, overall response still very positive. He is now at his lowest weight since maybe Middle School. Denies adverse effects generally aside from mild constipation. Occasionally requires Miralax once or twice weekly. Has improved with exercise and water intake increase.  Has one shot remaining.    Weight loss history:  Medication History:  Type/Duration of Weight management trials:  Fluid/Water intake: 24-36 oz water daily. Zhen with dinner.   Diet: Skips breakfast, lunch leftovers are generally, lean protein, chicken, turkey, vegetables, carb, rice, pasta, tries for whole wheat. Has tried whole 30 programs, low salt, no added sugars, salad kits, grilled chicken. Endorses sweets intake, tries to stick to less processed food. Portion size has gotten much smaller.   Physical activity: Rowing and weight lifting. 10-20 minutes rowing, 10-20 minutes weight training/kettle bells. Plans to increase to 30 minutes x 3-5 days/week.     Medical History:  MEN2/Medullary Thyroid Cancer: Negative   Pancreatitis: Negative     Current  weight: 267 lbs  Initial Consult Weight: 305 lbs  Cumulative Weight loss: 50 lbs  Target weight: 250 lbs     Wt Readings from Last 4 Encounters:   01/11/24 121.1 kg (267 lb)   09/08/23 124.8 kg (275 lb 1.6 oz)   05/03/23 138.4 kg (305 lb 1.6 oz)   02/18/22 141.7 kg (312 lb 6.4 oz)     Body Mass Index (BMI) Body mass index is 38.59 kg/m .    Today's Vitals: Wt 121.1 kg (267 lb)   BMI 38.59 kg/m      Lab Results   Component Value Date    A1C 5.1 05/03/2023     Lab Results   Component Value Date    CHOL 193 05/03/2023     Lab Results   Component Value Date    HDL 37 05/03/2023     Lab Results   Component Value Date     05/03/2023     Lab Results   Component Value Date    TRIG 116 05/03/2023     ----------------      I spent 21 minutes with this patient today. All changes were made via collaborative practice agreement with Chen Welsh PA-C . A copy of the visit note was provided to the patient's provider(s).    A summary of these recommendations was sent via [employer].    Segundo Partida, PharmD, BCACP  Medication Therapy Management Pharmacist  [facility]     Telemedicine Visit Details  Type of service:  Video Conference via [employer]  Joined the call at 1/11/2024, 1:57:10 pm.  Left the call at 1/11/2024, 2:18:32 pm.  You were on the call for 21 minutes 22 seconds .     Medication Therapy Recommendations  No medication therapy recommendations to display           Please do not hesitate to contact me if you have any questions/concerns.     Sincerely,     SEGUNDO PARTIDA [facility]

## 2024-01-11 NOTE — PATIENT INSTRUCTIONS
"Recommendations from today's MTM visit:                                                    MTM (medication therapy management) is a service provided by a clinical pharmacist designed to help you get the most of out of your medicines.      Remain on Wegovy 2.4 mg once weekly.      Follow up with me in June as scheduled.    It was great speaking with you today.  I value your experience and would be very thankful for your time in providing feedback in our clinic survey. In the next few days, you may receive an email or text message from [facility] [employer] with a link to a survey related to your  clinical pharmacist.\"     To schedule another MTM appointment, please call the clinic directly or you may call the MTM scheduling line at [phone].    My Clinical Pharmacist's contact information:                                                      Please feel free to contact me with any questions or concerns you have.      Segundo Partida, PharmD, BCACP  Medication Therapy Management Pharmacist  [facility]    "

## 2024-01-11 NOTE — PROGRESS NOTES
Medication Therapy Management (MTM) Encounter    ASSESSMENT:                            Medication Adherence/Access: No issues identified    Weight management: Weight loss progress has reached relative plateau, however patient global response to Wegovy at 2.4 mg has yielded significant weight loss without problematic adverse effects.  Mild constipation is self-limited.  Appropriate candidate to continue Wegovy 2.4 mg once weekly.  Briefly discussed potential use of Zepbound if continued weight plateau.  Pretreatment BMI greater than 40 kg/m . Negative history of pancreatitis, medullary thyroid cancer and multiple endocrine neoplasia type 2.      For patients that are under [employer] Employee/[employer] insurance coverage, it is mandated by insurance that each qualifying patient meet with hospital based Weight Management Medication Therapy Management pharmacist to continue therapy coverage. The following patient meets the below coverage criteria and can therefore continue GLP-1/GIP agonist therapy for Weight Management:    Adult  BMI >40 with or without comorbidities   OR   BMI >30 + NAFLD*   at time of initiating GLP-1/GIP agonist therapy Approved for 29 weeks  Met Updated Initial Criteria   At least 5% weight loss of baseline body weight  Approved for 12 months        PLAN:                            Remain on Wegovy 2.4 mg once weekly.     Follow up with me in June as scheduled.     SUBJECTIVE/OBJECTIVE:                          Anjum Tejada is a 33 year old male contacted via secure video for an initial visit. He was referred to me from [facility] Insurance Requirement.      Reason for visit: GLP-1 agonist consult.    Allergies/ADRs: Reviewed in chart  Past Medical History: Reviewed in chart  Tobacco: He reports that he has never smoked. He has never used smokeless tobacco.    Medication Adherence/Access: no issues reported    Weight management:  Wegovy 2.4 mg weekly     Has been on Wegovy since May 2023, max dose  for 4 months. Weight loss has currently stalled, though feels he has some things he can implement to move things along, overall response still very positive. He is now at his lowest weight since maybe Middle School. Denies adverse effects generally aside from mild constipation. Occasionally requires Miralax once or twice weekly. Has improved with exercise and water intake increase.  Has one shot remaining.    Weight loss history:  Medication History:  Type/Duration of Weight management trials:  Fluid/Water intake: 24-36 oz water daily. Zhen with dinner.   Diet: Skips breakfast, lunch leftovers are generally, lean protein, chicken, turkey, vegetables, carb, rice, pasta, tries for whole wheat. Has tried whole 30 programs, low salt, no added sugars, salad kits, grilled chicken. Endorses sweets intake, tries to stick to less processed food. Portion size has gotten much smaller.   Physical activity: Rowing and weight lifting. 10-20 minutes rowing, 10-20 minutes weight training/kettle bells. Plans to increase to 30 minutes x 3-5 days/week.     Medical History:  MEN2/Medullary Thyroid Cancer: Negative   Pancreatitis: Negative     Current weight: 267 lbs  Initial Consult Weight: 305 lbs  Cumulative Weight loss: 50 lbs  Target weight: 250 lbs     Wt Readings from Last 4 Encounters:   01/11/24 121.1 kg (267 lb)   09/08/23 124.8 kg (275 lb 1.6 oz)   05/03/23 138.4 kg (305 lb 1.6 oz)   02/18/22 141.7 kg (312 lb 6.4 oz)     Body Mass Index (BMI) Body mass index is 38.59 kg/m .    Today's Vitals: Wt 121.1 kg (267 lb)   BMI 38.59 kg/m      Lab Results   Component Value Date    A1C 5.1 05/03/2023     Lab Results   Component Value Date    CHOL 193 05/03/2023     Lab Results   Component Value Date    HDL 37 05/03/2023     Lab Results   Component Value Date     05/03/2023     Lab Results   Component Value Date    TRIG 116 05/03/2023     ----------------      I spent 21 minutes with this patient today. All changes were made  via collaborative practice agreement with Chen Welsh PA-C . A copy of the visit note was provided to the patient's provider(s).    A summary of these recommendations was sent via [employer].    Segundo Partida, PharmD, BCACP  Medication Therapy Management Pharmacist  [facility]     Telemedicine Visit Details  Type of service:  Video Conference via [employer]  Joined the call at 1/11/2024, 1:57:10 pm.  Left the call at 1/11/2024, 2:18:32 pm.  You were on the call for 21 minutes 22 seconds .     Medication Therapy Recommendations  No medication therapy recommendations to display

## 2024-01-11 NOTE — NURSING NOTE
Is the patient currently in the state of [place]? YES    Visit mode:VIDEO    If the visit is dropped, the patient can be reconnected by: VIDEO VISIT: Text to cell phone:   Telephone Information:   Mobile [phone]       Will anyone else be joining the visit? NO  (If patient encounters technical issues they should call [phone]    How would you like to obtain your AVS? MyChart    Are changes needed to the allergy or medication list? No    Reason for visit: Consult    Radha CAMPA

## 2024-01-11 NOTE — Clinical Note
FYI -patient doing very well on Wegovy 2.4 mg once weekly without concerning adverse effects.  I renewed orders for him to remain on Wegovy at 2.4 mg weekly.  He follows with [facility].  I have follow-up with him scheduled in June.  Thank you, Enoch

## 2024-03-15 ENCOUNTER — TELEPHONE (OUTPATIENT)
Dept: FAMILY MEDICINE | Facility: CLINIC | Age: 34
End: 2024-03-15
Payer: COMMERCIAL

## 2024-03-15 DIAGNOSIS — L98.9 SKIN LESION: Primary | ICD-10-CM

## 2024-03-25 ENCOUNTER — TELEPHONE (OUTPATIENT)
Dept: INTERNAL MEDICINE | Facility: CLINIC | Age: 34
End: 2024-03-25
Payer: COMMERCIAL

## 2024-03-25 DIAGNOSIS — U07.1 INFECTION DUE TO 2019 NOVEL CORONAVIRUS: Primary | ICD-10-CM

## 2024-03-25 NOTE — TELEPHONE ENCOUNTER
RN COVID TREATMENT VISIT  03/25/24      The patient has been triaged and does not require a higher level of care.    Anjum Tejada  34 year old  Current weight?     Has the patient been seen by a primary care provider at an [facility] or [facility] within the past two years? Yes.   Have you been in close proximity to/do you have a known exposure to a person with a confirmed case of influenza? No.     General treatment eligibility:  Date of positive COVID test (PCR or at home)?  3/24/24    Are you or have you been hospitalized for this COVID-19 infection? No.   Have you received monoclonal antibodies or antiviral treatment for COVID-19 since this positive test? No.   Do you have any of the following conditions that place you at risk of being very sick from COVID-19?   - Overweight or Obesity (BMI >85th percentile or BMI 25 or higher)  Yes, patient has at least one high risk condition as noted above.     Current COVID symptoms:   - fever or chills  - cough  - fatigue  - muscle or body aches  - headache  Yes. Patient has at least one symptom as selected.     How many days since symptoms started? 5 days or less. Established patient, 12 years or older weighing at least 88.2 lbs, who has symptoms that started in the past 5 days, has not been hospitalized nor received treatment already, and is at risk for being very sick from COVID-19.     Treatment eligibility by RN:  Are you currently pregnant or nursing? No  Do you have a clinically significant hypersensitivity to nirmatrelvir or ritonavir, or toxic epidermal necrolysis (TEN) or Joiner-Vinay Syndrome? No  Do you have a history of hepatitis, any hepatic impairment on the Problem List (such as Child-Elizabeth Class C, cirrhosis, fatty liver disease, alcoholic liver disease), or was the last liver lab (hepatic panel, ALT, AST, ALK Phos, bilirubin) elevated in the past 6 months? No  Do you have any history of severe renal impairment (eGFR <  30mL/min)? No    Is patient eligible to continue? Yes, patient meets all eligibility requirements for the RN COVID treatment (as denoted by all no responses above).     Current Outpatient Medications   Medication Sig Dispense Refill    insulin pen needle (31G X 5 MM) 31G X 5 MM miscellaneous Use 1 pen needles daily or as directed with wegovy. 30 each 1    Semaglutide-Weight Management (WEGOVY) 2.4 MG/0.75ML pen Inject 2.4 mg Subcutaneous once a week 9 mL 1       Medications from List 1 of the standing order (on medications that exclude the use of Paxlovid) that patient is taking: NONE. Is patient taking Debbie's Wort? No  Is patient taking [place]'s Wort or any meds from List 1? No.   Medications from List 2 of the standing order (on meds that provider needs to adjust) that patient is taking: NONE. Is patient on any of the meds from List 2? No.   Medications from List 3 of standing order (on meds that a RN needs to adjust) that patient is taking: NONE. Is patient on any meds from List 3? No.     Paxlovid has an approximate 90% reduction in hospitalization. Paxlovid can possibly cause altered sense of taste, diarrhea (loose, watery stools), high blood pressure, muscle aches.     Would patient like a Paxlovid prescription?   Yes.   Lab Results   Component Value Date    GFRESTIMATED >90 09/08/2023       Was last eGFR reduced? No, eGFR 60 or greater/ No Result on record. Patient can receive the normal renal function dose. Paxlovid Rx sent        Temporary change to home medications: None    All medication adjustments (holds, etc) were discussed with the patient and patient was asked to repeat back (teachback) their med adjustment.  Did patient understand med adjustment? No medication adjustments needed.         Reviewed the following instructions with the patient:    Paxlovid (nimatrelvir and ritonavir)    How it works  Two medicines (nirmatrelvir and ritonavir) are taken together. They stop the virus from growing.  Less amount of virus is easier for your body to fight.    How to take  Medicine comes in a daily container with both medicine tablets. Take by mouth twice daily (once in the morning, once at night) for 5 days.  The number of tablets to take varies by patient.  Don't chew or break capsules. Swallow whole.    When to take  Take as soon as possible after positive COVID-19 test result, and within 5 days of your first symptoms.    Possible side effects  Can cause altered sense of taste, diarrhea (loose, watery stools), high blood pressure, muscle aches.    Marisol Bar, RN

## 2024-04-03 ENCOUNTER — PATIENT OUTREACH (OUTPATIENT)
Dept: CARE COORDINATION | Facility: CLINIC | Age: 34
End: 2024-04-03
Payer: COMMERCIAL

## 2024-06-20 ENCOUNTER — VIRTUAL VISIT (OUTPATIENT)
Dept: CARDIOLOGY | Facility: CLINIC | Age: 34
End: 2024-06-20
Attending: PHYSICIAN ASSISTANT
Payer: COMMERCIAL

## 2024-06-20 VITALS — BODY MASS INDEX: 38.66 KG/M2 | WEIGHT: 261 LBS | HEIGHT: 69 IN

## 2024-06-20 DIAGNOSIS — E66.01 MORBID OBESITY (H): Primary | ICD-10-CM

## 2024-06-20 ASSESSMENT — PAIN SCALES - GENERAL: PAINLEVEL: NO PAIN (0)

## 2024-06-20 NOTE — PROGRESS NOTES
Medication Therapy Management (MTM) Encounter    ASSESSMENT:                            Medication Adherence/Access: No issues identified    Weight management: Modest weight loss progress with continued use of Wegovy at 2.4 mg once weekly.  Wegovy continues to be well-tolerated with no further constipation as previously noted, some occasional dietary related dumping syndrome.  Given his sustained progress, advise continuation of Wegovy 2.4 mg once weekly.       PLAN:                            Remain on Wegovy 2.4 mg once weekly.    Call [phone] in September/October to schedule a follow-up phone call with a MTM pharmacist in the weight management clinic for January.    SUBJECTIVE/OBJECTIVE:                          Thuan Tejada is a 34 year old male contacted via secure video for a follow-up visit.       Reason for visit: Wegovy follow-up.    Allergies/ADRs: Reviewed in chart  Past Medical History: Reviewed in chart  Tobacco: He reports that he has never smoked. He has never used smokeless tobacco.      Medication Adherence/Access: no issues reported    Weight management:  Wegovy 2.4 mg weekly    Video consult to discuss ongoing use of Wegovy.  Patient states that overall he continues to do well, has lost down to 260 lbs currently, with weight now stable. Still get some occasional dumping syndrome when eating too much, will occur a couple times per week. Feels is primarily as a result of overeating.. Constipation has resolved. No vomiting. Some occasional, mild, tolerable nausea especially in the mornings. No abdominal pain ,acid reflux or other discomfort.  He is happy with progress made to date and would like to work on behavioral and dietary modifications for further weight loss progress.    Wt Readings from Last 4 Encounters:   06/20/24 118.4 kg (261 lb)   01/11/24 121.1 kg (267 lb)   09/08/23 124.8 kg (275 lb 1.6 oz)   05/03/23 138.4 kg (305 lb 1.6 oz)     Body Mass Index (BMI) Body mass index is 38.54  "kg/m .    Today's Vitals: Ht 1.753 m (5' 9\")   Wt 118.4 kg (261 lb)   BMI 38.54 kg/m      Lab Results   Component Value Date    A1C 5.1 05/03/2023     ----------------      I spent 7 minutes with this patient today. All changes were made via collaborative practice agreement with Chen Welsh PA-C . A copy of the visit note was provided to the patient's provider(s).    A summary of these recommendations was sent via [employer].    Segundo Partida, PharmD, BCACP  Medication Therapy Management Pharmacist  [facility]     Telemedicine Visit Details  Type of service:  Ayana  Joined the call at 6/20/2024, 1:01:58 pm.  Left the call at 6/20/2024, 1:09:12 pm.  You were on the call for 7 minutes 14 seconds .     Medication Therapy Recommendations  No medication therapy recommendations to display     "

## 2024-06-20 NOTE — NURSING NOTE
Is the patient currently in the state of [place]? YES    Visit mode:VIDEO    If the visit is dropped, the patient can be reconnected by: VIDEO VISIT: Text to cell phone:   Telephone Information:   Mobile [phone]       Will anyone else be joining the visit? NO  (If patient encounters technical issues they should call [phone]    How would you like to obtain your AVS? MyChart    Are changes needed to the allergy or medication list? No    Are refills needed on medications prescribed by this physician? NO    Reason for visit: RECHECK    Eriberto CAMPA

## 2024-06-20 NOTE — Clinical Note
6/20/2024      RE: Anjum Tejada  [address] [facility] [address]       Dear Colleague,    Thank you for the opportunity to participate in the care of your patient, Anjum Tejada, at the [facility]. Please see a copy of my visit note below.    Medication Therapy Management (MTM) Encounter    ASSESSMENT:                            Medication Adherence/Access: No issues identified    Weight management: Modest weight loss progress with continued use of Wegovy at 2.4 mg once weekly.  Wegovy continues to be well-tolerated with no further constipation as previously noted, some occasional dietary related dumping syndrome.  Given his sustained progress, advise continuation of Wegovy 2.4 mg once weekly.       PLAN:                            Remain on Wegovy 2.4 mg once weekly.    Call [phone] in September/October to schedule a follow-up phone call with a MTM pharmacist in the weight management clinic for January.    SUBJECTIVE/OBJECTIVE:                          Thuan Tejada is a 34 year old male contacted via secure video for a follow-up visit.       Reason for visit: Wegovy follow-up.    Allergies/ADRs: Reviewed in chart  Past Medical History: Reviewed in chart  Tobacco: He reports that he has never smoked. He has never used smokeless tobacco.      Medication Adherence/Access: no issues reported    Weight management:  Wegovy 2.4 mg weekly    Video consult to discuss ongoing use of Wegovy.  Patient states that overall he continues to do well, has lost down to 260 lbs currently, with weight now stable. Still get some occasional dumping syndrome when eating too much, will occur a couple times per week. Feels is primarily as a result of overeating.. Constipation has resolved. No vomiting. Some occasional, mild, tolerable nausea especially in the mornings. No abdominal pain ,acid reflux or other discomfort.  He is happy  "with progress made to date and would like to work on behavioral and dietary modifications for further weight loss progress.    Wt Readings from Last 4 Encounters:   06/20/24 118.4 kg (261 lb)   01/11/24 121.1 kg (267 lb)   09/08/23 124.8 kg (275 lb 1.6 oz)   05/03/23 138.4 kg (305 lb 1.6 oz)     Body Mass Index (BMI) Body mass index is 38.54 kg/m .    Today's Vitals: Ht 1.753 m (5' 9\")   Wt 118.4 kg (261 lb)   BMI 38.54 kg/m      Lab Results   Component Value Date    A1C 5.1 05/03/2023     ----------------      I spent 7 minutes with this patient today. All changes were made via collaborative practice agreement with Chen Welsh PA-C . A copy of the visit note was provided to the patient's provider(s).    A summary of these recommendations was sent via [employer].    Segundo Partida, PharmD, BCACP  Medication Therapy Management Pharmacist  [facility]     Telemedicine Visit Details  Type of service:  Ayana  Joined the call at 6/20/2024, 1:01:58 pm.  Left the call at 6/20/2024, 1:09:12 pm.  You were on the call for 7 minutes 14 seconds .     Medication Therapy Recommendations  No medication therapy recommendations to display         Please do not hesitate to contact me if you have any questions/concerns.     Sincerely,     SEGUNDO PARTIDA [facility]  "

## 2024-06-20 NOTE — PATIENT INSTRUCTIONS
"Recommendations from today's MTM visit:                                                    MTM (medication therapy management) is a service provided by a clinical pharmacist designed to help you get the most of out of your medicines.      Remain on Wegovy 2.4 mg once weekly.     Call [phone] in September/October to schedule a follow-up phone call with a MTM pharmacist in the weight management clinic for January.    It was great speaking with you today.  I value your experience and would be very thankful for your time in providing feedback in our clinic survey. In the next few days, you may receive an email or text message from [employer] with a link to a survey related to your  clinical pharmacist.\"     To schedule another MTM appointment, please call the clinic directly or you may call the MTM scheduling line at [phone].    My Clinical Pharmacist's contact information:                                                      Please feel free to contact me with any questions or concerns you have.      Segundo Partida, PharmD, BCACP  Medication Therapy Management Pharmacist  [facility]    "

## 2024-10-30 ENCOUNTER — PATIENT OUTREACH (OUTPATIENT)
Dept: CARE COORDINATION | Facility: CLINIC | Age: 34
End: 2024-10-30
Payer: COMMERCIAL

## 2024-11-13 ENCOUNTER — IMMUNIZATION (OUTPATIENT)
Dept: FAMILY MEDICINE | Facility: CLINIC | Age: 34
End: 2024-11-13
Payer: COMMERCIAL

## 2024-11-13 DIAGNOSIS — Z23 HIGH PRIORITY FOR 2019-NCOV VACCINE: Primary | ICD-10-CM

## 2024-11-13 PROCEDURE — 99207 PR NO CHARGE LOS: CPT

## 2024-11-13 PROCEDURE — 90480 ADMN SARSCOV2 VAC 1/ONLY CMP: CPT

## 2024-11-13 PROCEDURE — 91320 SARSCV2 VAC 30MCG TRS-SUC IM: CPT

## 2024-11-13 NOTE — PROGRESS NOTES
Prior to immunization administration, verified patients identity using patient s name and date of birth. Please see Immunization Activity for additional information.     Screening Questionnaire for Adult Immunization    Are you sick today?   No   Do you have allergies to medications, food, a vaccine component or latex?   No   Have you ever had a serious reaction after receiving a vaccination?   No   Do you have a long-term health problem with heart, lung, kidney, or metabolic disease (e.g., diabetes), asthma, a blood disorder, no spleen, complement component deficiency, a cochlear implant, or a spinal fluid leak?  Are you on long-term aspirin therapy?   No   Do you have cancer, leukemia, HIV/AIDS, or any other immune system problem?   No   Do you have a parent, brother, or sister with an immune system problem?   No   In the past 3 months, have you taken medications that affect  your immune system, such as prednisone, other steroids, or anticancer drugs; drugs for the treatment of rheumatoid arthritis, Crohn s disease, or psoriasis; or have you had radiation treatments?   No   Have you had a seizure, or a brain or other nervous system problem?   No   During the past year, have you received a transfusion of blood or blood    products, or been given immune (gamma) globulin or antiviral drug?   No   For women: Are you pregnant or is there a chance you could become       pregnant during the next month?   No   Have you received any vaccinations in the past 4 weeks?   No     Immunization questionnaire answers were all negative.    I have reviewed the following standing orders:   This patient is due and qualifies for the Covid-19 vaccine.     Click here for COVID-19 Standing Order    I have reviewed the vaccines inclusion and exclusion criteria; No concerns regarding eligibility.     Patient instructed to remain in clinic for 15 minutes afterwards, and to report any adverse reactions.     Screening performed by Sara PINEDA  STEFFANIE Davey on 11/13/2024 at 11:34 AM.

## 2024-11-17 ENCOUNTER — HEALTH MAINTENANCE LETTER (OUTPATIENT)
Age: 34
End: 2024-11-17

## 2025-02-03 ENCOUNTER — TELEPHONE (OUTPATIENT)
Dept: FAMILY MEDICINE | Facility: CLINIC | Age: 35
End: 2025-02-03
Payer: COMMERCIAL

## 2025-02-03 DIAGNOSIS — S39.012A STRAIN OF LUMBAR REGION, INITIAL ENCOUNTER: Primary | ICD-10-CM

## 2025-04-03 DIAGNOSIS — E66.01 MORBID OBESITY (H): Primary | ICD-10-CM

## 2025-07-03 ENCOUNTER — TELEPHONE (OUTPATIENT)
Dept: INTERNAL MEDICINE | Facility: CLINIC | Age: 35
End: 2025-07-03
Payer: COMMERCIAL

## 2025-07-03 DIAGNOSIS — M62.838 MUSCLE SPASM: Primary | ICD-10-CM

## 2025-07-03 RX ORDER — METHOCARBAMOL 750 MG/1
750 TABLET, FILM COATED ORAL 3 TIMES DAILY
Qty: 60 TABLET | Refills: 1 | Status: SHIPPED | OUTPATIENT
Start: 2025-07-03

## 2025-07-03 NOTE — TELEPHONE ENCOUNTER
Is can have continuing having cervical muscle spasms not controlled with the Flexeril we will try Venessa Vargas CNP

## 2025-07-07 ENCOUNTER — OFFICE VISIT (OUTPATIENT)
Dept: FAMILY MEDICINE | Facility: CLINIC | Age: 35
End: 2025-07-07
Payer: COMMERCIAL

## 2025-07-07 ENCOUNTER — ANCILLARY PROCEDURE (OUTPATIENT)
Dept: GENERAL RADIOLOGY | Facility: CLINIC | Age: 35
End: 2025-07-07
Attending: FAMILY MEDICINE
Payer: COMMERCIAL

## 2025-07-07 DIAGNOSIS — M54.12 CERVICAL RADICULOPATHY: Primary | ICD-10-CM

## 2025-07-07 DIAGNOSIS — M54.12 CERVICAL RADICULOPATHY: ICD-10-CM

## 2025-07-07 DIAGNOSIS — E66.01 MORBID OBESITY (H): ICD-10-CM

## 2025-07-07 PROCEDURE — 99213 OFFICE O/P EST LOW 20 MIN: CPT | Performed by: FAMILY MEDICINE

## 2025-07-07 PROCEDURE — 72040 X-RAY EXAM NECK SPINE 2-3 VW: CPT | Mod: TC | Performed by: RADIOLOGY

## 2025-07-07 RX ORDER — PREDNISONE 20 MG/1
40 TABLET ORAL DAILY
Qty: 10 TABLET | Refills: 0 | Status: SHIPPED | OUTPATIENT
Start: 2025-07-07 | End: 2025-07-08 | Stop reason: ALTCHOICE

## 2025-07-07 NOTE — PROGRESS NOTES
Assessment & Plan     Cervical radiculopathy  Trial of prednisone to settle this down   MRI if the above does not help   - XR Cervical Spine 2/3 Views; Future  - MR Cervical Spine w/o Contrast; Future  - methylPREDNISolone (MEDROL DOSEPAK) 4 MG tablet therapy pack; Follow Package Directions    Morbid obesity (H)  Working with PCP on this         Subjective   Thuan is a 35 year old, presenting for the following health issues:  Neck Pain      7/7/2025     8:35 AM   Additional Questions   Roomed by Ana MAURICIO CMA     History of Present Illness       Reason for visit:  Neck pain         Neck Pain    Duration: several weeks   Description:  Location: right neck and upper thoracic area   Radiation: some to right arm  Intensity:  moderate  Accompanying signs and symptoms: some mild tingling of th efingers   Noted weakness and spasm of the triceps   History (similar episodes/previous evaluation): off and on neck upper thoracic pain that usually resolves with supportive and coservative care  Precipitating or alleviating factors: things he splept worng woke with this weeks ago   Therapies tried and outcome: otc pain meds and ice and heat and stretching             Review of Systems  Constitutional, HEENT, cardiovascular, pulmonary, gi and gu systems are negative, except as otherwise noted.      Objective    There were no vitals taken for this visit.  There is no height or weight on file to calculate BMI.  Physical Exam   GENERAL: alert and no distress  NECK: no adenopathy, no asymmetry, masses, or scars, and palpable tenderness of the right paraspinous and upper thoracic paraspinous mm  Mild weakness of right triceps     NEURO: sensory exam grossly normal    Xray - Reviewed and interpreted by me.  Mild dengereation noted C6-7        Signed Electronically by: Milvia Liz MD

## 2025-07-08 RX ORDER — METHYLPREDNISOLONE 4 MG/1
TABLET ORAL
Qty: 21 TABLET | Refills: 0 | Status: SHIPPED | OUTPATIENT
Start: 2025-07-08

## 2025-07-09 PROBLEM — M54.12 CERVICAL RADICULOPATHY: Status: ACTIVE | Noted: 2025-07-09

## 2025-07-22 ENCOUNTER — HOSPITAL ENCOUNTER (OUTPATIENT)
Dept: MRI IMAGING | Facility: HOSPITAL | Age: 35
Discharge: HOME | End: 2025-07-22
Attending: FAMILY MEDICINE
Payer: COMMERCIAL

## 2025-07-22 DIAGNOSIS — M54.12 CERVICAL RADICULOPATHY: ICD-10-CM

## 2025-07-22 PROCEDURE — 72141 MRI NECK SPINE W/O DYE: CPT

## 2025-07-24 ENCOUNTER — PATIENT OUTREACH (OUTPATIENT)
Dept: CARE COORDINATION | Facility: CLINIC | Age: 35
End: 2025-07-24
Payer: COMMERCIAL

## 2025-08-13 ENCOUNTER — OFFICE VISIT (OUTPATIENT)
Dept: PHYSICAL MEDICINE AND REHAB | Facility: CLINIC | Age: 35
End: 2025-08-13
Attending: FAMILY MEDICINE
Payer: COMMERCIAL

## 2025-08-13 VITALS — HEART RATE: 73 BPM | DIASTOLIC BLOOD PRESSURE: 79 MMHG | SYSTOLIC BLOOD PRESSURE: 123 MMHG

## 2025-08-13 DIAGNOSIS — M47.12 CERVICAL SPONDYLOSIS WITH MYELOPATHY: ICD-10-CM

## 2025-08-13 DIAGNOSIS — M54.12 CERVICAL RADICULOPATHY: Primary | ICD-10-CM

## 2025-08-13 PROCEDURE — 99204 OFFICE O/P NEW MOD 45 MIN: CPT | Performed by: NURSE PRACTITIONER

## 2025-08-13 PROCEDURE — 3074F SYST BP LT 130 MM HG: CPT | Performed by: NURSE PRACTITIONER

## 2025-08-13 PROCEDURE — 3078F DIAST BP <80 MM HG: CPT | Performed by: NURSE PRACTITIONER

## 2025-08-13 PROCEDURE — 1125F AMNT PAIN NOTED PAIN PRSNT: CPT | Performed by: NURSE PRACTITIONER

## 2025-08-13 ASSESSMENT — PAIN SCALES - GENERAL: PAINLEVEL_OUTOF10: MILD PAIN (1)

## 2025-08-14 ENCOUNTER — TRANSFERRED RECORDS (OUTPATIENT)
Dept: HEALTH INFORMATION MANAGEMENT | Facility: CLINIC | Age: 35
End: 2025-08-14
Payer: COMMERCIAL